# Patient Record
Sex: MALE | Race: OTHER | HISPANIC OR LATINO | URBAN - METROPOLITAN AREA
[De-identification: names, ages, dates, MRNs, and addresses within clinical notes are randomized per-mention and may not be internally consistent; named-entity substitution may affect disease eponyms.]

---

## 2017-07-10 ENCOUNTER — EMERGENCY (EMERGENCY)
Facility: HOSPITAL | Age: 62
LOS: 1 days | Discharge: HOME | End: 2017-07-10
Attending: EMERGENCY MEDICINE
Payer: SELF-PAY

## 2017-07-10 VITALS
TEMPERATURE: 99 F | DIASTOLIC BLOOD PRESSURE: 67 MMHG | HEIGHT: 64 IN | SYSTOLIC BLOOD PRESSURE: 138 MMHG | OXYGEN SATURATION: 99 % | HEART RATE: 69 BPM | RESPIRATION RATE: 18 BRPM | WEIGHT: 164.91 LBS

## 2017-07-10 PROCEDURE — 72170 X-RAY EXAM OF PELVIS: CPT | Mod: 26

## 2017-07-10 PROCEDURE — 76377 3D RENDER W/INTRP POSTPROCES: CPT | Mod: 26

## 2017-07-10 PROCEDURE — 76377 3D RENDER W/INTRP POSTPROCES: CPT

## 2017-07-10 PROCEDURE — 99284 EMERGENCY DEPT VISIT MOD MDM: CPT | Mod: 25

## 2017-07-10 PROCEDURE — 72131 CT LUMBAR SPINE W/O DYE: CPT

## 2017-07-10 PROCEDURE — 96372 THER/PROPH/DIAG INJ SC/IM: CPT

## 2017-07-10 PROCEDURE — 72131 CT LUMBAR SPINE W/O DYE: CPT | Mod: 26

## 2017-07-10 PROCEDURE — 72192 CT PELVIS W/O DYE: CPT | Mod: 26

## 2017-07-10 PROCEDURE — T1013: CPT

## 2017-07-10 PROCEDURE — 72170 X-RAY EXAM OF PELVIS: CPT

## 2017-07-10 PROCEDURE — 72192 CT PELVIS W/O DYE: CPT

## 2017-07-10 PROCEDURE — 99053 MED SERV 10PM-8AM 24 HR FAC: CPT

## 2017-07-10 RX ORDER — METHOCARBAMOL 500 MG/1
2 TABLET, FILM COATED ORAL
Qty: 18 | Refills: 0 | OUTPATIENT
Start: 2017-07-10 | End: 2017-07-14

## 2017-07-10 RX ORDER — KETOROLAC TROMETHAMINE 30 MG/ML
30 SYRINGE (ML) INJECTION ONCE
Qty: 0 | Refills: 0 | Status: DISCONTINUED | OUTPATIENT
Start: 2017-07-10 | End: 2017-07-10

## 2017-07-10 RX ORDER — LIDOCAINE 4 G/100G
1 CREAM TOPICAL ONCE
Qty: 0 | Refills: 0 | Status: COMPLETED | OUTPATIENT
Start: 2017-07-10 | End: 2017-07-10

## 2017-07-10 RX ORDER — IBUPROFEN 200 MG
1 TABLET ORAL
Qty: 20 | Refills: 0 | OUTPATIENT
Start: 2017-07-10 | End: 2017-07-15

## 2017-07-10 RX ORDER — METHOCARBAMOL 500 MG/1
1500 TABLET, FILM COATED ORAL ONCE
Qty: 0 | Refills: 0 | Status: COMPLETED | OUTPATIENT
Start: 2017-07-10 | End: 2017-07-10

## 2017-07-10 RX ADMIN — Medication 30 MILLIGRAM(S): at 08:30

## 2017-07-10 RX ADMIN — LIDOCAINE 1 PATCH: 4 CREAM TOPICAL at 08:39

## 2017-07-10 RX ADMIN — METHOCARBAMOL 1500 MILLIGRAM(S): 500 TABLET, FILM COATED ORAL at 08:30

## 2017-07-10 NOTE — ED ADULT TRIAGE NOTE - CHIEF COMPLAINT QUOTE
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 Pt states he is unable to sit and pain radiates down left leg. Pt states he took aspirin today.
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
## 2017-07-10 NOTE — ED STATDOCS - PROGRESS NOTE DETAILS
Language Services was utilized in obtaining the H & P and throughout the duration of the patient's care.  NP NOTE: Pt seen by intake physician and HPI/ROS/PE/MDM reviewed. Pt seen and evaluated. Discussed plan and any resulted studies at this time. Will continue to monitor and re-evaluate.  Re-Evaluation: CT obtained to help differentiate FB in soft tissue vs in embedded in bone. results noted. pt feeling improved after medications. will dc with spine center [facility].

## 2017-07-10 NOTE — ED STATDOCS - MEDICAL DECISION MAKING DETAILS
Likely sciatica. Pain control and X-ray to r/o foreign body. No signs of spinal cord compression. Motor movement limited by pain. F/u outpatient with [facility]. L buttock pain c/w sciatica. Pain control and X-ray to r/o foreign body. No signs of spinal cord compression. Motor movement limited by pain. F/u outpatient with [facility] of further eval/ mri/ poss PT referral.

## 2022-04-15 NOTE — ED ADULT TRIAGE NOTE - NSWEIGHTCALCTOOLDRUG_GEN_A_CORE
DISPLAY PLAN FREE TEXT DISPLAY PLAN FREE TEXT DISPLAY PLAN FREE TEXT DISPLAY PLAN FREE TEXT DISPLAY PLAN FREE TEXT DISPLAY PLAN FREE TEXT DISPLAY PLAN FREE TEXT DISPLAY PLAN FREE TEXT DISPLAY PLAN FREE TEXT DISPLAY PLAN FREE TEXT DISPLAY PLAN FREE TEXT DISPLAY PLAN FREE TEXT DISPLAY PLAN FREE TEXT  used DISPLAY PLAN FREE TEXT DISPLAY PLAN FREE TEXT DISPLAY PLAN FREE TEXT

## 2022-05-01 ENCOUNTER — EMERGENCY (EMERGENCY)
Facility: HOSPITAL | Age: 67
LOS: 1 days | Discharge: HOME | End: 2022-05-01
Attending: EMERGENCY MEDICINE
Payer: SELF-PAY

## 2022-05-01 VITALS
TEMPERATURE: 98 F | WEIGHT: 184.97 LBS | HEIGHT: 64 IN | RESPIRATION RATE: 18 BRPM | DIASTOLIC BLOOD PRESSURE: 80 MMHG | OXYGEN SATURATION: 99 % | HEART RATE: 73 BPM | SYSTOLIC BLOOD PRESSURE: 117 MMHG

## 2022-05-01 PROCEDURE — 90715 TDAP VACCINE 7 YRS/> IM: CPT

## 2022-05-01 PROCEDURE — 99282 EMERGENCY DEPT VISIT SF MDM: CPT

## 2022-05-01 PROCEDURE — 99283 EMERGENCY DEPT VISIT LOW MDM: CPT | Mod: 25

## 2022-05-01 PROCEDURE — 90471 IMMUNIZATION ADMIN: CPT

## 2022-05-01 RX ORDER — ERYTHROMYCIN BASE 5 MG/GRAM
1 OINTMENT (GRAM) OPHTHALMIC (EYE) EVERY 6 HOURS
Refills: 0 | Status: DISCONTINUED | OUTPATIENT
Start: 2022-05-01 | End: 2022-05-06

## 2022-05-01 RX ORDER — ERYTHROMYCIN BASE 5 MG/GRAM
1 OINTMENT (GRAM) OPHTHALMIC (EYE) THREE TIMES A DAY
Refills: 0 | Status: DISCONTINUED | OUTPATIENT
Start: 2022-05-01 | End: 2022-05-01

## 2022-05-01 RX ORDER — ERYTHROMYCIN BASE 5 MG/GRAM
1 OINTMENT (GRAM) OPHTHALMIC (EYE)
Qty: 1 | Refills: 0
Start: 2022-05-01 | End: 2022-05-05

## 2022-05-01 RX ORDER — FLUORESCEIN SODIUM 9 MG
2 STRIP OPHTHALMIC (EYE) ONCE
Refills: 0 | Status: COMPLETED | OUTPATIENT
Start: 2022-05-01 | End: 2022-05-01

## 2022-05-01 RX ORDER — TETANUS TOXOID, REDUCED DIPHTHERIA TOXOID AND ACELLULAR PERTUSSIS VACCINE, ADSORBED 5; 2.5; 8; 8; 2.5 [IU]/.5ML; [IU]/.5ML; UG/.5ML; UG/.5ML; UG/.5ML
0.5 SUSPENSION INTRAMUSCULAR ONCE
Refills: 0 | Status: COMPLETED | OUTPATIENT
Start: 2022-05-01 | End: 2022-05-01

## 2022-05-01 RX ADMIN — TETANUS TOXOID, REDUCED DIPHTHERIA TOXOID AND ACELLULAR PERTUSSIS VACCINE, ADSORBED 0.5 MILLILITER(S): 5; 2.5; 8; 8; 2.5 SUSPENSION INTRAMUSCULAR at 17:10

## 2022-05-01 RX ADMIN — Medication 1 APPLICATION(S): at 17:12

## 2022-05-01 RX ADMIN — Medication 1 DROP(S): at 16:59

## 2022-05-01 RX ADMIN — Medication 2 APPLICATION(S): at 16:59

## 2022-05-01 NOTE — ED PROVIDER NOTE - PATIENT PORTAL LINK FT
You can access the FollowMyHealth Patient Portal offered by [facility] by registering at the following website: [email or web address]. By joining [employer]’s FollowMyHealth portal, you will also be able to view your health information using other applications (apps) compatible with our system.

## 2022-05-01 NOTE — ED PROVIDER NOTE - OBJECTIVE STATEMENT
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 states  he wash it right away . states and use eye wash OTC as well as Ketotifen eye drop since then dose not help . denies any trauma or using the eye glasses or eye contact . states he feels foreign body  on the right eye   unknown last tetanus   vector

## 2022-05-01 NOTE — ED PROVIDER NOTE - CLINICAL SUMMARY MEDICAL DECISION MAKING FREE TEXT BOX
visual acuity and fluorecin exam with abrasion   d.c OTC med . update the tetanus and erythromycin cream    f.u opthalmology

## 2022-05-01 NOTE — ED ADULT TRIAGE NOTE - CHIEF COMPLAINT QUOTE
pt states he got soap in right eye on wednesday , getting worse,   A&Ox3, resp wnl, right eye sclera red c/o pain

## 2022-05-01 NOTE — ED PROVIDER NOTE - PHYSICAL EXAMINATION
EYEs:   PERR, EOMI  b/l Eye no signs of hyphema B/l   Right eye :conjunctiva erythematous no d.c noted , eye lash intact .   Right eye fluorecin exam With uptake on the lower corneal noted , fluorecin of the left eye wnl   PH of the right eye 7  Right eye irrigated With 50cc saline,    visual acuity is not able to obtain due to pt lac of education

## 2022-05-01 NOTE — ED PROVIDER NOTE - NSFOLLOWUPINSTRUCTIONS_ED_ALL_ED_FT
use the eye cream 3-4 days on the right eye   avoid using the those over the counter medication  please call and follow up with eye doctor   use la crema para los ojos 3-4 días en el jed derecho  evite usar los medicamentos de venta maicol  por favor llame y suzanna un seguimiento con el oftalmólogo    Conjuntivitis por sustancias químicas en los adultos    Chemical Conjunctivitis, Adult       La conjuntivitis u jed rubin por sustancias químicas es flip inflamación en la conjuntiva a causa de flip sustancia química irritante. La conjuntiva es la membrana transparente que recubre la parte roxanna del jed y la superficie interna del párpado. Esta afección hace que el jed se ponga de color rubin o julio cesar, y le pique.    Esta afección puede presentarse en shanta o en ambos ojos. No se transmite de flip persona a otra (no es contagiosa).    Ciertos productos químicos, especialmente ácidos o bases ross, pueden causar daños graves en el jed, lo que a veces provoca ceguera en el jed.      ¿Cuáles son las causas?    Esta afección es provocada por flip sustancia química u otra sustancia irritante que penetra en el jed, por ejemplo:  •Humo.      •Cloro.      •Jabón.      •Gases.      •Contaminación del aire.        ¿Qué incrementa el riesgo?    Los siguientes factores pueden hacer que sea más propenso a desarrollar esta afección:  •Vivir en lugares con altos niveles de contaminación del aire.      •Trabajar en ambientes con sustancias químicas en el aire.      •Utilizar piscinas a menudo.      •Usar lentes de contacto.        ¿Cuáles son los signos o síntomas?    Los síntomas de esta afección incluyen:  •Ojos rojos.      •Picazón en los ojos.      •Sensación de ardor en los ojos.      •Dolor en el jed.      •Drenaje transparente en los ojos.      •Hinchazón de los párpados.      •Sensibilidad a la westley.        ¿Cómo se diagnostica?    Esta afección se diagnostica en función de charles síntomas, antecedentes médicos y de un examen físico. Mony el examen, nicole médico puede usar un instrumento médico que utiliza flip westley magnificada (lámpara de hendidura) para examinarle los ojos. Si tiene un drenaje en el jed, michele se puede analizar para descartar otras causas.      ¿Cómo se trata?     El tratamiento de esta afección implica enjuagar cuidadosamente (irrigar) la sustancia química del jed. Cuanto antes se szuanna esto, mejor. El tratamiento también puede incluir lo siguiente:  •Lágrimas artificiales que ayudan a eliminar cualquier ronal de sustancia química.      •Gotas oftálmicas con corticoesteroides para aliviar la inflamación.      •Antibióticos para prevenir infecciones.      •Aplicar paños fríos y húmedos (compresas frías) para aliviar las molestias y la inflamación.      •Analgésicos de venta maicol para aliviar las molestias.        Siga estas instrucciones en nicole casa:    Medicamentos     •[place] o aplíquese los medicamentos de venta maicol y los recetados solamente frandy se lo haya indicado el médico.      •Si le recetaron un antibiótico, tómelo o aplíqueselo frandy se lo haya indicado el médico. No deje de usar el antibiótico aunque comience a sentirse mejor.      •Use gotas y pomadas oftálmicas frandy se lo haya indicado el médico.      Instrucciones generales   •Hasta que haya desaparecido la inflamación o mony el tiempo que le haya indicado el médico:  •No use lentes de contacto. En nicole lugar, use anteojos.      •No use maquillaje de ojos.      •No se toque ni se frote los ojos.        •Aplíquese flip compresa limpia y fría en el jed mony 10 a 20 minutos, de 3 a 4 veces al día para aliviar las molestias, según sea necesario.      •Evite estar cerca de la sustancia química o estar en el ambiente que le causó la irritación. Use protección en los ojos siempre que sea necesario.      •Lávese las asaf con agua y jabón mony al menos 20 segundos antes de aplicarse las gotas oftálmicas o las compresas frías en los ojos. Use desinfectante para asaf si no dispone de agua y jabón.      •Concurra a todas las visitas de seguimiento. [place] es importante.        Comuníquese con un médico si:    •Los síntomas no mejoran o empeoran.      •Aparecen nuevos síntomas.      •El dolor empeora.      •Le supura pus del jed.        Solicite ayuda de inmediato si:    •Le empeora la visión repentinamente.        Resumen    •La conjuntivitis u jed rubin por sustancias químicas es flip inflamación en la conjuntiva a causa de flip sustancia química irritante. La conjuntiva es la membrana transparente que recubre la parte roxanna del jed y la superficie interna del párpado.      •Esta afección se diagnostica en función de charles síntomas, antecedentes médicos y de un examen físico.      •El tratamiento de esta afección implica enjuagar cuidadosamente (leida) el jed para eliminar la sustancia química. Es posible que se le receten gotas oftálmicas.      •Hasta que la inflamación haya desaparecido, no use lentes de contacto ni maquillaje en el jed.      Esta información no tiene frandy fin reemplazar el consejo del médico. Asegúrese de hacerle al médico cualquier pregunta que tenga.

## 2022-05-01 NOTE — ED PROVIDER NOTE - CARE PROVIDER_API CALL
Willig, Jeffrey L (MD)  Ophthalmology  [address] [place] [address]  Phone: [phone]  Fax: [phone]  Follow Up Time: Urgent

## 2022-05-01 NOTE — ED PROVIDER NOTE - ATTENDING APP SHARED VISIT CONTRIBUTION OF CARE
The patient seen and examined    Chemical Conjunctivitis    I, Doyle Allen, performed the initial face to face bedside interview with this patient regarding history of present illness, review of symptoms and relevant past medical, social and family history.  I completed an independent physical examination.  I was the initial provider who evaluated this patient. I have signed out the follow up of any pending tests (i.e. labs, radiological studies) to the ACP.  I have communicated the patient’s plan of care and disposition with the ACP.

## 2022-05-01 NOTE — ED PROVIDER NOTE - NS ED ATTENDING STATEMENT MOD
This was a shared visit with the MARGI. I reviewed and verified the documentation and independently performed the documented: